# Patient Record
Sex: MALE | Race: WHITE | NOT HISPANIC OR LATINO | URBAN - METROPOLITAN AREA
[De-identification: names, ages, dates, MRNs, and addresses within clinical notes are randomized per-mention and may not be internally consistent; named-entity substitution may affect disease eponyms.]

---

## 2017-03-13 ENCOUNTER — OFFICE VISIT - RIVER FALLS (OUTPATIENT)
Dept: FAMILY MEDICINE | Facility: CLINIC | Age: 67
End: 2017-03-13

## 2017-03-13 ASSESSMENT — MIFFLIN-ST. JEOR: SCORE: 1883.91

## 2018-04-05 ENCOUNTER — OFFICE VISIT - RIVER FALLS (OUTPATIENT)
Dept: FAMILY MEDICINE | Facility: CLINIC | Age: 68
End: 2018-04-05

## 2022-02-12 VITALS
DIASTOLIC BLOOD PRESSURE: 76 MMHG | SYSTOLIC BLOOD PRESSURE: 130 MMHG | HEIGHT: 63 IN | OXYGEN SATURATION: 96 % | HEART RATE: 70 BPM | BODY MASS INDEX: 48.18 KG/M2

## 2022-02-12 VITALS
HEIGHT: 63 IN | BODY MASS INDEX: 48.2 KG/M2 | RESPIRATION RATE: 16 BRPM | HEART RATE: 60 BPM | TEMPERATURE: 97.8 F | SYSTOLIC BLOOD PRESSURE: 124 MMHG | WEIGHT: 272 LBS | DIASTOLIC BLOOD PRESSURE: 84 MMHG

## 2022-02-16 NOTE — PROGRESS NOTES
Patient:   HEMANT JOHNSON            MRN: [id number]            FIN: [id number]               Age:   66 years     Sex:  Male     :  1950   Associated Diagnoses:   Benign essential hypertension; Dyslipidemia   Author:   Rashid Laura PA-C      Chief Complaint   3/13/2017 9:44 AM CDT    Pt here for medication check,refills and fasting lab work        History of Present Illness   Chief complaint and symptoms noted above and confirmed with patient   here today for med refills for his HTN and hyperlipidemia  due for repeat colon CA screening but declines  passed hearing screen         Review of Systems   Constitutional:  Negative.    Ear/Nose/Mouth/Throat:  Negative.    Respiratory:  Negative.    Cardiovascular:  Negative.    Gastrointestinal:  Negative.       Health Status   Allergies:    Allergic Reactions (Selected)  No known allergies   Medications:  (Selected)   Prescriptions  Prescribed  Metoprolol Succinate ER 25 mg oral tablet, extended release: 1 tab(s) ( 25 mg ), po, daily, # 90 tab(s), 1 Refill(s), Type: Maintenance, Pharmacy: [facility], due for visit, 1 tab(s) po daily,x90 day(s)  Td Vaccine: Td Vaccine, See Instructions, Instructions: Pt is due for 1 Td Vaccine, Supply, # 1 EA, 0 Refill(s), Type: Maintenance  aspirin 650 mg oral tablet: 1 tab(s) ( 650 mg ), PO, daily, PRN:   for pain, # 30 tab(s), 0 Refill(s), Type: Maintenance, OTC (Rx)  losartan 50 mg oral tablet: 1 tab(s) ( 50 mg ), po, daily, # 90 tab(s), 1 Refill(s), Type: Maintenance, Pharmacy: [facility], due for visit, 1 tab(s) po daily,x90 day(s)  simvastatin 40 mg oral tablet: 1 tab(s) ( 40 mg ), po, hs, # 30 tab(s), 0 Refill(s), Type: Maintenance, Pharmacy: [facility], needs to keep scheduled appt, 1 tab(s) po hs   Problem list:    All Problems (Selected)  Diverticulosis of colon / SNOMED CT 5972701622 / Confirmed  Homocysteinemia / ICD-9-.4 / Confirmed  Obese / ICD-9-.00 / Probable  Benign essential hypertension / SNOMED  CT 1038912 / Confirmed  Dyslipidemia / SNOMED CT 497378829 / Confirmed  CAD (Coronary Artery Disease) / ICD-9-.00 / Confirmed  Pulmonary Embolism / ICD-9-.19 / Confirmed  Fatty Liver / ICD-9-.8 / Confirmed  Abdominal Pain / ICD-9-.00 / Confirmed  Prediabetes / ICD-9-.29 / Confirmed  Ex-Smoker / ICD-9-CM V15.82 / Confirmed      Histories   Past Medical History:    Active  Fatty Liver (571.8): Onset on 2005 at 55 years.  Comments:  2010 CDT 1:15 PM CDT - Sofia Patricia  day unknown  Dyslipidemia (699527932): Onset on 2003 at 53 years.  Comments:  2010 CDT 1:16 PM CDT - Sofia , Patricia  day unknown  Diverticulosis of colon (4341832787): Onset on 1994 at 44 years.  Comments:  2010 CDT 1:12 PM CDT - Patricia Black  with spasms    2010 CDT 1:13 PM CDT - Sofia , Patricia  Day unknown.  Benign essential hypertension (5199254)  Abdominal Pain (789.00)  Comments:  2010 CDT 1:19 PM Patricia Bartholomew  with bloating  Ex-Smoker (V15.82)  Comments:  2010 CDT 1:29 PM CDT Saúl Black , Patricia  quit 1994  Pulmonary Embolism (415.19)  Prediabetes (790.29)  Homocysteinemia (270.4)  Obese (278.00)  CAD (Coronary Artery Disease) (414.00)  Resolved  *Hospitalized@[facility] - Coronary artery disease: Onset on 2011 at 61 years.  Resolved.  H/O: viral illness (859057516): Onset in  at 54 years.  Resolved.  Long-Term (Current) Use of Anticoagulants, INR Goal 2.0-3.0 (V58.61):  Resolved.  PE with negative work up 2009:  Resolved.  Alcohol addiction stopped drinking 2011 ([id number]):  Resolved.   Family History:    Ulcer  Father ()  CA - Lung cancer  Mother ()  Comments:  2014 2:16 PM - Aiyana Naqvi  smoker  Liver disease  Father ()     Procedure history:    Colonoscopy (407009459) on 2005 at 55 Years.  Comments:  2014 2:17 PM - Aiyana Naqvi  polypectomy x 4  Flexible sigmoidoscopy (64556558) in  1994 at 44 Years.  Vasectomy on 4/1/1982 at 32 Years.  Comments:  7/1/2010 1:31 PM - GaelPatricia da silva  day unknown  Stress test abnormal 2011.  Cath procedure done with mod disease but no stent.   Social History:        Tobacco Assessment: Past            Current                     Comments:                      07/01/2010 - Patricia Black                     quit 09/27/1994        Physical Examination   Vital Signs   3/13/2017 9:44 AM CDT Temperature Tympanic 97.8 DegF  LOW    Peripheral Pulse Rate 60 bpm    Pulse Site Radial artery    Respiratory Rate 16 br/min    Systolic Blood Pressure 124 mmHg    Diastolic Blood Pressure 84 mmHg    Mean Arterial Pressure 97 mmHg    BP Site Right arm      Measurements from flowsheet : Measurements   3/13/2017 9:44 AM CDT Height Measured - Standard 63 in    Weight Measured - Standard 272 lb    BSA 2.34 m2    Body Mass Index 48.18 kg/m2      General:  No acute distress.    HENT:  Tympanic membranes are clear, No pharyngeal erythema, No sinus tenderness.    Neck:  Supple, Non-tender, No lymphadenopathy.    Respiratory:  Lungs are clear to auscultation.    Cardiovascular:  Normal rate, Regular rhythm, No murmur, No edema.    Gastrointestinal:  Soft, Non-tender.       Impression and Plan   Diagnosis     Benign essential hypertension (HBK20-VT I10).     Dyslipidemia (QTT73-CN E78.2).     Course:  Well controlled, Good response to treatment.    Summary:  will renew his labs and check labs.    Orders     Orders   Requests (Lab):  Lipid Panel and Chol/HDL Ratio w/ Reflex to Direct LDL (Request) (Order): Dyslipidemia  Benign essential hypertension  Basic Metabolic Panel (Request) (Order): Dyslipidemia  Benign essential hypertension.     Orders   Requests (Lab):  PSA (Request) (Order): Screening for diabetes mellitus  Screening for prostate cancer  Hgb A1c (Request) (Order): Screening for diabetes mellitus  Screening for prostate cancer.     Orders   Pharmacy:  Metoprolol Succinate ER  25 mg oral tablet, extended release (Prescribe): 1 tab(s) ( 25 mg ), po, daily, # 90 tab(s), 3 Refill(s), Type: Maintenance, Pharmacy: [facility], 1 tab(s) po daily  losartan 50 mg oral tablet (Prescribe): 1 tab(s) ( 50 mg ), po, daily, # 90 tab(s), 3 Refill(s), Type: Maintenance, Pharmacy: [facility], 1 tab(s) po daily  simvastatin 40 mg oral tablet (Prescribe): 1 tab(s) ( 40 mg ), po, hs, # 90 tab(s), 3 Refill(s), Type: Maintenance, Pharmacy: [facility], 1 tab(s) po hs  Metoprolol Succinate ER 25 mg oral tablet, extended release (Modify): 1 tab(s) ( 25 mg ), po, daily, x 90 day(s), # 90 tab(s), 1 Refill(s), Type: Hard Stop, Pharmacy: [facility], due for visit  losartan 50 mg oral tablet (Modify): 1 tab(s) ( 50 mg ), po, daily, x 90 day(s), # 90 tab(s), 1 Refill(s), Type: Hard Stop, Pharmacy: [facility], due for visit  simvastatin 40 mg oral tablet (Modify): 1 tab(s) ( 40 mg ), po, hs, # 30 tab(s), 0 Refill(s), Type: Hard Stop, Pharmacy: [facility], needs to keep scheduled appt.     Orders   Charges (Evaluation and Management):  16606 office outpatient visit 15 minutes (Charge) (Order): Quantity: 1, Dyslipidemia  Benign essential hypertension  Screening for diabetes mellitus  Screening for prostate cancer.Patient declines HgbA1c and PSA testing because it would be covered by Medicare

## 2022-02-16 NOTE — TELEPHONE ENCOUNTER
** Patient matched by Montserrat Cabral CMA on 10/27/2020 2:15:27 PM CDT **      ------------------------------------------  From: [facility]  To: Lopez Naima  Sent: October 27, 2020 2:04:52 PM CDT  Subject: Medication Management  Due: October 8, 2020 2:04:31 PM CDT     ** On Hold Pending Signature **     Drug: atorvastatin (Lipitor 40 mg oral tablet), TAKE ONE TABLET BY MOUTH AT BEDTIME  Quantity: 90 EA  Days Supply: 90  Refills: 1  Substitutions Allowed  Notes from Pharmacy:     Dispensed Drug: atorvastatin (atorvastatin 40 mg oral tablet), TAKE ONE TABLET BY MOUTH AT BEDTIME  Quantity: 90 EA  Days Supply: 90  Refills: 1  Substitutions Allowed  Notes from Pharmacy:     ** On Hold Pending Signature **     Drug: losartan (losartan 100 mg oral tablet), TAKE ONE TABLET BY MOUTH ONCE DAILY GAVE FIVE NC 07/22/20  Quantity: 90 EA  Days Supply: 90  Refills: 1  Substitutions Allowed  Notes from Pharmacy:     Dispensed Drug: losartan (losartan 100 mg oral tablet), TAKE ONE TABLET BY MOUTH ONCE DAILY GAVE FIVE NC 07/22/20  Quantity: 90 EA  Days Supply: 90  Refills: 1  Substitutions Allowed  Notes from Pharmacy:     ** On Hold Pending Signature **     Drug: metFORMIN (MetFORMIN (Eqv-Glucophage XR) 500 mg oral tablet, extended release), TAKE ONE TABLET BY MOUTH ONCE DAILY  Quantity: 90 EA  Days Supply: 90  Refills: 1  Substitutions Allowed  Notes from Pharmacy:     Dispensed Drug: metFORMIN (MetFORMIN (Eqv-Glucophage XR) 500 mg oral tablet, extended release), TAKE ONE TABLET BY MOUTH ONCE DAILY  Quantity: 90 EA  Days Supply: 90  Refills: 1  Substitutions Allowed  Notes from Pharmacy:  ---------------------------------------------------------------  From: Montserrat Cabral CMA   To: [facility]    Sent: 10/27/2020 2:23:19 PM CDT  Subject: Medication Management     ** Not Approved: Prescriber not associated with location, Transferred care to [facility]. Notified [facility] to resend. **  atorvastatin (ATORVASTATIN CALCIUM  40MG TABLET)  TAKE ONE TABLET BY MOUTH AT BEDTIME  Qty:  90 EA        Days Supply:  90        Refills:  1          Substitutions Allowed     Route To Pharmacy - [facility]   Signed by Montserrat Cabral CMA    ** Not Approved: Prescriber not associated with location, Transferred care to [facility]. [facility]ified [facility] to resend. **  losartan (LOSARTAN POTASSIUM 100MG TABLET)  TAKE ONE TABLET BY MOUTH ONCE DAILY GAVE FIVE NC 07/22/20  Qty:  90 EA        Days Supply:  90        Refills:  1          Substitutions Allowed     Route To Pharmacy - [facility]   Signed by Montserrat Cabral CMA    ** Not Approved: Prescriber not associated with location, Transferred care to [facility]. Notified [facility] to resend. **  metFORMIN (METFORMIN HYDROCHLORIDE ER 500MG ER TABLET ER 24HR)  TAKE ONE TABLET BY MOUTH ONCE DAILY  Qty:  90 EA        Days Supply:  90        Refills:  1          Substitutions Allowed     Route To Pharmacy - [facility]   Signed by Montserrat Cabral CMA